# Patient Record
Sex: MALE | Race: WHITE
[De-identification: names, ages, dates, MRNs, and addresses within clinical notes are randomized per-mention and may not be internally consistent; named-entity substitution may affect disease eponyms.]

---

## 2018-02-26 ENCOUNTER — HOSPITAL ENCOUNTER (OUTPATIENT)
Dept: HOSPITAL 80 - FED | Age: 28
Setting detail: OBSERVATION
LOS: 1 days | Discharge: HOME | End: 2018-02-27
Attending: INTERNAL MEDICINE | Admitting: INTERNAL MEDICINE
Payer: COMMERCIAL

## 2018-02-26 DIAGNOSIS — E86.9: ICD-10-CM

## 2018-02-26 DIAGNOSIS — I45.19: ICD-10-CM

## 2018-02-26 DIAGNOSIS — R91.8: ICD-10-CM

## 2018-02-26 DIAGNOSIS — J98.4: ICD-10-CM

## 2018-02-26 DIAGNOSIS — R00.0: Primary | ICD-10-CM

## 2018-02-26 LAB — PLATELET # BLD: 300 10^3/UL (ref 150–400)

## 2018-02-26 RX ADMIN — IPRATROPIUM BROMIDE AND ALBUTEROL SULFATE SCH: .5; 3 SOLUTION RESPIRATORY (INHALATION) at 23:49

## 2018-02-26 NOTE — GHP
[f rep st]



                                                            HISTORY AND PHYSICAL





DATE OF ADMISSION:  02/26/2018



CHIEF COMPLAINT:  Tachycardia.



HISTORY:  The patient is a 27-year-old male who performed a physical agility test today at [facility]
 [employer] [place].  This is approximately a 5 minute high-intensity test.  He has done it a few times befo
re, but this time he pushed harder than he has ever before.  After he was finished his heart rate did
 not go back down and remained persistently about 150.  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
n recommended he come to the emergency room.  He said he felt severe fatigue during the agility test,
 and at 1 point he thought he might pass out.  The intensity, however, is much beyond what he normall
y does in his workouts.  He did have some shortness of breath.  He also complains of mid sternal vero
p chest pain.  These symptoms are now improved.  He has recently had pharyngitis symptoms including a
 sore throat and was diagnosed with a pharyngeal ulcer.



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
st left thoracotomy and wedge resection of the left upper lobe with talc pleurodesis.



MEDICATIONS:  Please see computer record for full detailed list.



ALLERGIES:  No known drug allergies.



SOCIAL HISTORY:  No smoking.  He drinks a 6-pack of alcohol per week, but has not had any alcohol now
 for a couple of weeks.  He denies any drug use.  He lives with his girlfriend.  He works  an
d  at [employer] in [place].



REVIEW OF SYSTEMS:  Complete review of systems obtained.  Review of systems negative on constitutiona
l, HEENT, GI, pulmonary, cardiovascular, , hematology, skin, musculoskeletal, endocrine, psych, exc
ept for positives and negatives as in HPI.



FAMILY HISTORY:  Reviewed, noncontributory to presenting complaint.



PHYSICAL EXAMINATION:  GENERAL:  Well-developed, well-nourished male, in no distress.  VITAL SIGNS:  
Temperature 36.8, pulse 123, blood pressure 137/77, satting 95% on room air.  EYES:  Normal conjuncti
vae.  Pupils equal, react to light.  ENT:  Normal ears and nose.  Hearing intact.  Normal lips and te
eth.  Oropharynx moist.  NECK:  Trachea midline.  No thyromegaly.  CHEST:  Normal effort.  LUNGS:  Sc
attered wheezing.  CARDIOVASCULAR:  Regular rhythm.  No murmur.  No extremity edema.  ABDOMEN:  Soft,
 nontender.  No hepatosplenomegaly.  SKIN:  Warm, dry, intact, without rash.  MUSCULOSKELETAL:  No cy
anosis or clubbing.  Strength 5/5 upper and lower extremities.  NEURO:  Cranial nerves intact.  Lluvia
l sensation to light touch.  PSYCH:  Alert and oriented x3.  Normal affect.  Normal judgment and insi
ght.  Normal memory.



LABORATORY DATA:  White count 14.3, hematocrit 49, platelets 300.  Sodium 142, potassium 4.2 chloride
 106, bicarb 21, BUN 15, creatinine 1.0, glucose 91.  Troponins negative.  Lactate 0.7.  D-dimer 0.9.
  



EKG viewed by me.  My personal interpretation is sinus tachycardia.  CT angiogram of the chest shows 
a pleural-based nodule, likely due to his prior tube.



ASSESSMENT/PLAN:  

1.  Tachycardia.  This is unusually persistent after only a 5 minute intense physical activity.  We w
ill continue intravenous fluids.  Pulmonary embolus has been ruled out.  We will check an echocardiog
hilda, a TSH. 

2.  Mild reactive airway disease exacerbation.  I do hear some scattered wheezing.  We will start him
 on nebs.  Will use Xopenex so as to not worsen his tachycardia.  We will check a respiratory polymer
ase chain reaction.  I wonder if he may have a mild reactive airway disease brewing which may explain
 his exaggerated tachycardia during exertion.  

3.  History of bleb disease causing pneumomediastinum.  I find this unusual in an otherwise healthy [age]-year-old male.  We will screen him for alpha 1 antitrypsin deficiency.  

4.  Code status is full.



ADMISSION STATUS:  

1.  Will admit to observation.  We will re-evaluate tomorrow regarding ongoing need for hospitalizati
on.  

2.  DVT prophylaxis.  He is low risk.  Will ambulate early.





Job #:  [id number]

## 2018-02-26 NOTE — EDPHY
H & P


Time Seen by Provider: 02/26/18 17:28


HPI/ROS: 





Chief complaint.  Fast heart rate





HPI.  27-year-old male presents emergency department with fast heart rate.  He 

arrives by EMS.  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 

min afterwards he had some chest discomfort some shortness of breath and 

continuing fast heart rate.  He tells me it was 140-150 per EMS.  He has some 

anterior chest tightness without radiation.  No shortness of breath.  He has a 

slight sore throat and 3 days ago was tested for strep which was negative.  He 

has a an ulcer in the posterior left pharynx.  He has no fever.  No abdominal 

pain.  No unusual leg pain or swelling.





ROS


Constitutional.  no fever/chills, no weakness


Eyes.  no problems with vision


ENT.  Sore throat


Cardiovascular.  Chest tightness and fast heart rate


Respiratory.  no shortness of breath, no cough


Abdominal.  no abdominal pain, no nausea/vomiting, no diarrhea


.  no problems urinating


MS.  no calf pain/swelling, no neck/back pain, no joint pain


Skin.  no rash


Lymph.  no swollen glands


Neuro.  no headache, no dizziness, no difficulty walking or with speech


Past Medical/Surgical History: 





Pneumomediastinum, hernia repair


Social History: 





Single, nonsmoker, no alcohol


Smoking Status: Former smoker


Physical Exam: 





General Appearance:  Alert well-developed male mild distress vital signs 

significant for initial heart rate of 123


Eyes: Pupils equal and round no pallor or injection.


ENT, pharynx slightly injected with apparent aphthous ulcer in the left 

posterior pharynx.  No evidence for PTA


Respiratory:  There are no retractions, lungs are clear to auscultation.


Cardiovascular:  Regular rate and rhythm with tachycardia


Gastrointestinal:   Abdomen is soft and nontender, no masses, bowel sounds 

normal.


Neurological:  Awake and alert, sensory and motor exams grossly normal.


Skin: Warm and dry, no rashes.


Musculoskeletal:  Neck is supple nontender.


Extremities  symmetrical, full range of motion.


Psychiatric: Patient is oriented X 3, there is no agitation.


Constitutional: 


 Initial Vital Signs











Temperature (C)  36.9 C   02/26/18 16:58


 


Heart Rate  123 H  02/26/18 16:58


 


Respiratory Rate  16   02/26/18 16:58


 


Blood Pressure  137/74 H  02/26/18 16:58


 


O2 Sat (%)  95   02/26/18 16:58








 











O2 Delivery Mode               Room Air














Allergies/Adverse Reactions: 


 





No Known Allergies Allergy (Unverified 02/26/18 16:58)


 











Medical Decision Making





- Diagnostics


EKG Interpretation: 





EKG interpreted by me interpreted by me shows sinus tachycardia with normal 

interval and axis.  There is incomplete right bundle branch block.  QRS is 

otherwise normal there is no significant ST elevation or depression.  Rate is 

109


Imaging Results: 


 Imaging Impressions





Chest X-Ray  02/26/18 18:31


Impression: Central bronchitis, otherwise negative..








Chest/Thorax CTA  02/26/18 19:06


Impression:   


1.  Negative CT examination of the chest for acute pulmonary thromboembolic 

disease.


2. New 1.8 cm centrally calcified smoothly marginated pleural based nodule left 

costophrenic angle posteriorly, potentially related to prior thoracostomy tube 

placement and potentially organized hematoma.


 


Cosigned Dr. Rajat Hinton.


 


Results called to Dr. Len Nation at 7:45 PM at the time of the 

interpretation. 








Chest x-ray interpreted by me appears normal other than some mild airway 

disease.  No evidence for pneumothorax or pneumomediastinum CT angiogram chest 

shows a 1.5 cm calcified nodule in the left costophrenic angle.  No pulmonary 

embolus.  No pneumothorax.





One-view chest x-ray shows mild airway disease


Procedures: 





IV normal saline and patient is given 2 L saline.


ED Course/Re-evaluation: 





Re-evaluation 7:05 p.m..  Patient is stable.  The patient and I discussed 

imaging EKG and lab results.  We discussed elevated D-dimer and need for chest 

CT.  He expresses understanding and agreement





Re-evaluation at 7:55 p.m. And after 2 L of fluid patient's heart rate is still 

113. Blood pressure 135/92.





I have spoken to the patient's mom who is a nurse practitioner.  I have 

recommended to patient and his mom that we admit him for persistent abnormal 

vital signs.  They expressed understanding


And agreement


I consulted and discussed the case with , hospitalist, who agrees to 

the admission.


Differential Diagnosis: 





Persistent tachycardia and apparent hyperdynamic state.  I considered pulmonary 

embolus as well as acute coronary syndrome and pneumonia pneumothorax.  Plan is 

admission





- Data Points


Laboratory Results: 


 Laboratory Results





 02/26/18 17:24 





 02/26/18 17:24 





 











  02/26/18 02/26/18 02/26/18





  17:24 17:24 17:24


 


WBC      14.39 10^3/uL H 10^3/uL





     (3.80-9.50) 


 


RBC      5.70 10^6/uL 10^6/uL





     (4.40-6.38) 


 


Hgb      17.0 g/dL g/dL





     (13.7-17.5) 


 


Hct      49.1 % %





     (40.0-51.0) 


 


MCV      86.1 fL fL





     (81.5-99.8) 


 


MCH      29.8 pg pg





     (27.9-34.1) 


 


MCHC      34.6 g/dL g/dL





     (32.4-36.7) 


 


RDW      12.4 % %





     (11.5-15.2) 


 


Plt Count      300 10^3/uL 10^3/uL





     (150-400) 


 


MPV      9.7 fL fL





     (8.7-11.7) 


 


Neut % (Auto)      82.2 % H %





     (39.3-74.2) 


 


Lymph % (Auto)      10.2 % L %





     (15.0-45.0) 


 


Mono % (Auto)      6.8 % %





     (4.5-13.0) 


 


Eos % (Auto)      0.1 % L %





     (0.6-7.6) 


 


Baso % (Auto)      0.3 % %





     (0.3-1.7) 


 


Nucleat RBC Rel Count      0.0 % %





     (0.0-0.2) 


 


Absolute Neuts (auto)      11.83 10^3/uL H 10^3/uL





     (1.70-6.50) 


 


Absolute Lymphs (auto)      1.47 10^3/uL 10^3/uL





     (1.00-3.00) 


 


Absolute Monos (auto)      0.98 10^3/uL H 10^3/uL





     (0.30-0.80) 


 


Absolute Eos (auto)      0.01 10^3/uL L 10^3/uL





     (0.03-0.40) 


 


Absolute Basos (auto)      0.04 10^3/uL 10^3/uL





     (0.02-0.10) 


 


Absolute Nucleated RBC      0.00 10^3/uL 10^3/uL





     (0-0.01) 


 


Immature Gran %      0.4 % %





     (0.0-1.1) 


 


Immature Gran #      0.06 10^3/uL 10^3/uL





     (0.00-0.10) 


 


D-Dimer    0.91 ug/mLFEU H ug/mLFEU  





    (0.00-0.50)  


 


Sodium  142 mEq/L mEq/L    





   (135-145)   


 


Potassium  4.2 mEq/L mEq/L    





   (3.5-5.2)   


 


Chloride  106 mEq/L mEq/L    





   ()   


 


Carbon Dioxide  21 mEq/l L mEq/l    





   (22-31)   


 


Anion Gap  15 mEq/L mEq/L    





   (8-16)   


 


BUN  15 mg/dL mg/dL    





   (7-23)   


 


Creatinine  1.0 mg/dL mg/dL    





   (0.7-1.3)   


 


Estimated GFR  > 60     





    


 


Glucose  91 mg/dL mg/dL    





   ()   


 


Calcium  10.0 mg/dL mg/dL    





   (8.5-10.4)   


 


Troponin I  < 0.012 ng/mL ng/mL    





   (0.000-0.034)   











Medications Given: 


 








Discontinued Medications





Sodium Chloride (Ns)  1,000 mls @ 0 mls/hr IV ONCE ONE


   PRN Reason: Wide Open


   Stop: 02/26/18 17:33


   Last Admin: 02/26/18 17:35 Dose:  1,000 mls


Sodium Chloride (Ns)  1,000 mls @ 0 mls/hr IV EDNOW ONE; Wide Open


   PRN Reason: Protocol


   Stop: 02/26/18 18:32


   Last Admin: 02/26/18 18:45 Dose:  1,000 mls








Departure





- Departure


Disposition: [facility] Inpatient Acute


Clinical Impression: 


 Tachycardia





Condition: Good


Instructions:  Tachycardia (ED)


Referrals: 


TD HAJI [Primary Care Provider] - As per Instructions

## 2018-02-26 NOTE — CPEKG
Heart Rate: 109

RR Interval: 550

P-R Interval: 184

QRSD Interval: 112

QT Interval: 336

QTC Interval: 453

P Axis: 77

QRS Axis: 70

T Wave Axis: 48

EKG Severity - ABNORMAL ECG -

EKG Impression: SINUS TACHYCARDIA

EKG Impression: PROBABLE LEFT ATRIAL ABNORMALITY

EKG Impression: INCOMPLETE RIGHT BUNDLE BRANCH BLOCK

Electronically Signed By: Len Nation 26-Feb-2018 22:34:26

## 2018-02-27 VITALS — RESPIRATION RATE: 18 BRPM | OXYGEN SATURATION: 97 %

## 2018-02-27 VITALS — HEART RATE: 74 BPM | TEMPERATURE: 98.7 F | SYSTOLIC BLOOD PRESSURE: 114 MMHG | DIASTOLIC BLOOD PRESSURE: 70 MMHG

## 2018-02-27 LAB
CK SERPL-CCNC: 192 IU/L (ref 0–224)
INR PPP: 1.16 (ref 0.83–1.16)
PLATELET # BLD: 258 10^3/UL (ref 150–400)
PROTHROMBIN TIME: 15 SEC (ref 12–15)

## 2018-02-27 RX ADMIN — IPRATROPIUM BROMIDE AND ALBUTEROL SULFATE SCH: .5; 3 SOLUTION RESPIRATORY (INHALATION) at 11:25

## 2018-02-27 RX ADMIN — IPRATROPIUM BROMIDE AND ALBUTEROL SULFATE SCH: .5; 3 SOLUTION RESPIRATORY (INHALATION) at 05:17

## 2018-02-27 NOTE — CPEKG
Heart Rate: 69

RR Interval: 870

P-R Interval: 144

QRSD Interval: 108

QT Interval: 404

QTC Interval: 433

P Axis: 48

QRS Axis: 66

T Wave Axis: 49

EKG Severity - NORMAL ECG -

EKG Impression: SINUS RHYTHM

EKG Impression: RATES HAVE SLOWED IN COMPARISON TO PRIOR

Electronically Signed By: Merrill Toledo 27-Feb-2018 11:33:59

## 2018-02-27 NOTE — HOSPPROG
Hospitalist Progress Note


Assessment/Plan: 





28 yo m w sinus tachycardia





sinus tach: cta neg  for pe and tsh ok


   no longer tachy


   await echo





bleb disease: alpha 1 anti trypsin pending





pleural density: LIKELY SCAR FROM SURGERY





disposition: home if echo normal


Subjective: tele: sinus, no longer tachycardic


Objective: 


 Vital Signs











Temp Pulse Resp BP Pulse Ox


 


 36.3 C   68   18   109/67   97 


 


 02/27/18 08:41  02/27/18 08:41  02/27/18 08:41  02/27/18 08:41  02/27/18 08:41








 Microbiology











 02/26/18 22:17 Respiratory Panel (PCR) - Final





 Nasal, Sinus - Swab    No Organism Detected








 Laboratory Results





 02/27/18 04:05 





 02/27/18 04:05 





 











 02/26/18 02/27/18 02/28/18





 05:59 05:59 05:59


 


Intake Total  4868 


 


Output Total  300 


 


Balance  4568 








 











PT  15.0 SEC (12.0-15.0)   02/27/18  04:05    


 


INR  1.16  (0.83-1.16)   02/27/18  04:05    














ICD10 Worksheet


Patient Problems: 


 Problems











Problem Status Onset


 


Tachycardia Acute  


 


Status post thoracotomy Acute

## 2018-02-27 NOTE — ECHO
[email or web address]





[address] [address] 

Main: [phone] 



Fax: 



Transthoracic Echocardiogram 

Name:             RJ PIEDRA                  MR#:

[id number]

Study Date:       2018                          Study Time:

10:09 AM

YOB: 1990                          Age:

27 year(s)

Height:           177.8 cm (70 in.)                   Weight:

68.95 kg (152 lb.)

BSA:              1.86 m2                             Gender:

Male

Examination:      Echo                                Indication:

Tachycardia

Image Quality:                                        Contrast: 

Requested by:     Yuli Morales                      BP:

109 mmHg/67 mmHg

Heart Rate:                                           Rhythm: 

Indication:       Tachycardia 



Procedure Staff 

Ultrasound Technician:   Olga Leong RDCS 

Reading Physician:       Leon Pineda MD 

Requesting Provider: 



Conclusions:          Normal study 



Measurements: 

Chambers                    Valvular Assessment AV/MV

Valvular Assessment TV/PV



Normal                                  Normal

Normal

Name         Value    Range             Name        Value Range

Name          Value Range

Ao Tete (MM): 2.9 cm   (2.2 cm-3.7           AV meanPG:  3 mmHg ( - )  



cm)               MV E Vmax:  0.71 m/s ( - )  

IVSd (2D):   0.7 cm (0.6 cm-1.1             MV A Vmax:  0.54 m/s ( - )





cm)               MV E/A:     1.31  ( - )  

LVDd (2D):   5.2 cm   (4.2 cm-5.9 



cm)   

LVDs (2D):   3.0 cm   (2.1 cm-4 



cm)   

LVPWd (2D):  0.7 cm   (0.6 cm-1 



cm)   

LVEF (MOD4): 70 %     (>=55 %)   

EF Range:    65-70 % 



Continued Measurements: 

Chambers                   Valvular Assessment AV/MV  



Name                    Value           Name                    Value



LADs:                 3.9 cm                MV E/E' Septal:

6.70

LADs Lon.8 cm                MV E/E' Lateral:

4.20

LA Area:              14.1 cm2   



Additional Vessels  



Patient: RJ PIEDRA                 MRN: [id number]

Study Date: 2018   Page 1 of 2

10:09 AM 









Name                      Value  

Ao Ascendin.5 cm    



Findings:             Left Ventricle: 

Normal size left ventricle. No LV hypertrophy. Normal global systolic

LV function. The ejection

fraction is estimated to be 65-70 %. No regional wall motion

abnormality.

Right Ventricle: 

Normal size right ventricle.  

Left Atrium: 

The left atrium is normal in size.  

Right Atrium: 

The right atrium is normal in size.  

Mitral Valve: 

The mitral valve is normal in appearance and function. Trivial mitral

valve regurgitation.

Aortic Valve: 

The aortic valve is normal in appearance and function.  

Tricuspid Valve: 

The tricuspid valve is normal in appearance and function. Trivial

tricuspid valve regurgitation.

Pulmonic Valve: 

The pulmonic valve is normal in appearance and function.  

Aorta: 

The aorta is normal.  

Pericardium: 

No pericardial effusion. 







Electronically signed by Leon Pineda MD on 2018 at 11:28 AM 

(No Signature Object) 



Patient: RJ PIEDRA                 MRN: [id number]

Study Date: 2018   Page 2 of 2

10:09 AM 







[id number]